# Patient Record
Sex: MALE | Race: WHITE
[De-identification: names, ages, dates, MRNs, and addresses within clinical notes are randomized per-mention and may not be internally consistent; named-entity substitution may affect disease eponyms.]

---

## 2019-09-27 ENCOUNTER — HOSPITAL ENCOUNTER (EMERGENCY)
Dept: HOSPITAL 7 - FB.ED | Age: 49
Discharge: HOME | End: 2019-09-27
Payer: COMMERCIAL

## 2019-09-27 DIAGNOSIS — S01.511A: Primary | ICD-10-CM
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
## 2019-09-27 PROCEDURE — 12011 RPR F/E/E/N/L/M 2.5 CM/<: CPT

## 2019-09-27 PROCEDURE — 99283 EMERGENCY DEPT VISIT LOW MDM: CPT
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
